# Patient Record
Sex: FEMALE | Race: OTHER | NOT HISPANIC OR LATINO
[De-identification: names, ages, dates, MRNs, and addresses within clinical notes are randomized per-mention and may not be internally consistent; named-entity substitution may affect disease eponyms.]

---

## 2019-12-18 PROBLEM — Z00.00 ENCOUNTER FOR PREVENTIVE HEALTH EXAMINATION: Status: ACTIVE | Noted: 2019-12-18

## 2019-12-20 ENCOUNTER — APPOINTMENT (OUTPATIENT)
Dept: SURGICAL ONCOLOGY | Facility: CLINIC | Age: 19
End: 2019-12-20
Payer: MEDICAID

## 2019-12-20 VITALS
BODY MASS INDEX: 24.54 KG/M2 | DIASTOLIC BLOOD PRESSURE: 90 MMHG | SYSTOLIC BLOOD PRESSURE: 139 MMHG | HEIGHT: 60 IN | HEART RATE: 115 BPM | RESPIRATION RATE: 15 BRPM | WEIGHT: 125 LBS

## 2019-12-20 PROCEDURE — 99244 OFF/OP CNSLTJ NEW/EST MOD 40: CPT

## 2019-12-20 NOTE — ASSESSMENT
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

## 2019-12-20 NOTE — ADDENDUM
[FreeTextEntry1] : I, Anyi Yepez, acted solely as a scribe for Dr. Naseem Bowie on this date 12/20/2019.

## 2019-12-20 NOTE — HISTORY OF PRESENT ILLNESS
[de-identified] : 18 y/o female patient presents for initial consultation for mass of the back of her neck. She notes that it is causing her discomfort, particularly within her shoulders.\par \par Denies fever or chills.

## 2019-12-20 NOTE — PHYSICAL EXAM
[Normal] : supple, no neck mass and thyroid not enlarged [Normal Groin Lymph Nodes] : normal groin lymph nodes [Normal Neck Lymph Nodes] : normal neck lymph nodes  [Normal Supraclavicular Lymph Nodes] : normal supraclavicular lymph nodes [Normal Axillary Lymph Nodes] : normal axillary lymph nodes [Normal] : oriented to person, place and time, with appropriate affect [de-identified] : Prominent soft tissue swelling posterior neck/upper back concerning for intramuscular lipoma. US does not identify any obvious mass.

## 2020-01-01 ENCOUNTER — FORM ENCOUNTER (OUTPATIENT)
Age: 20
End: 2020-01-01

## 2020-01-02 ENCOUNTER — APPOINTMENT (OUTPATIENT)
Dept: MRI IMAGING | Facility: IMAGING CENTER | Age: 20
End: 2020-01-02
Payer: MEDICAID

## 2020-01-02 ENCOUNTER — OUTPATIENT (OUTPATIENT)
Dept: OUTPATIENT SERVICES | Facility: HOSPITAL | Age: 20
LOS: 1 days | End: 2020-01-02
Payer: COMMERCIAL

## 2020-01-02 DIAGNOSIS — R22.1 LOCALIZED SWELLING, MASS AND LUMP, NECK: ICD-10-CM

## 2020-01-02 PROCEDURE — 70543 MRI ORBT/FAC/NCK W/O &W/DYE: CPT | Mod: 26

## 2020-01-02 PROCEDURE — A9585: CPT

## 2020-01-02 PROCEDURE — 70543 MRI ORBT/FAC/NCK W/O &W/DYE: CPT

## 2020-01-12 ENCOUNTER — TRANSCRIPTION ENCOUNTER (OUTPATIENT)
Age: 20
End: 2020-01-12

## 2020-01-15 ENCOUNTER — APPOINTMENT (OUTPATIENT)
Dept: SURGICAL ONCOLOGY | Facility: CLINIC | Age: 20
End: 2020-01-15
Payer: MEDICAID

## 2020-01-15 VITALS
SYSTOLIC BLOOD PRESSURE: 123 MMHG | HEIGHT: 60 IN | DIASTOLIC BLOOD PRESSURE: 84 MMHG | BODY MASS INDEX: 24.54 KG/M2 | OXYGEN SATURATION: 98 % | HEART RATE: 127 BPM | WEIGHT: 125 LBS

## 2020-01-15 PROCEDURE — 99214 OFFICE O/P EST MOD 30 MIN: CPT

## 2020-01-23 ENCOUNTER — OUTPATIENT (OUTPATIENT)
Dept: OUTPATIENT SERVICES | Facility: HOSPITAL | Age: 20
LOS: 1 days | End: 2020-01-23

## 2020-01-23 VITALS
HEIGHT: 60 IN | DIASTOLIC BLOOD PRESSURE: 70 MMHG | SYSTOLIC BLOOD PRESSURE: 110 MMHG | TEMPERATURE: 98 F | OXYGEN SATURATION: 98 % | RESPIRATION RATE: 16 BRPM | WEIGHT: 128.97 LBS | HEART RATE: 102 BPM

## 2020-01-23 DIAGNOSIS — R22.1 LOCALIZED SWELLING, MASS AND LUMP, NECK: ICD-10-CM

## 2020-01-23 DIAGNOSIS — R22.31 LOCALIZED SWELLING, MASS AND LUMP, RIGHT UPPER LIMB: ICD-10-CM

## 2020-01-23 LAB
HCG SERPL-ACNC: < 5 MIU/ML — SIGNIFICANT CHANGE UP
HCT VFR BLD CALC: 36.8 % — SIGNIFICANT CHANGE UP (ref 34.5–45)
HGB BLD-MCNC: 11.1 G/DL — LOW (ref 11.5–15.5)
MCHC RBC-ENTMCNC: 19.6 PG — LOW (ref 27–34)
MCHC RBC-ENTMCNC: 30.2 % — LOW (ref 32–36)
MCV RBC AUTO: 64.9 FL — LOW (ref 80–100)
NRBC # FLD: 0 K/UL — SIGNIFICANT CHANGE UP (ref 0–0)
PLATELET # BLD AUTO: 306 K/UL — SIGNIFICANT CHANGE UP (ref 150–400)
PMV BLD: 11.3 FL — SIGNIFICANT CHANGE UP (ref 7–13)
RBC # BLD: 5.67 M/UL — HIGH (ref 3.8–5.2)
RBC # FLD: 15.3 % — HIGH (ref 10.3–14.5)
WBC # BLD: 9.2 K/UL — SIGNIFICANT CHANGE UP (ref 3.8–10.5)
WBC # FLD AUTO: 9.2 K/UL — SIGNIFICANT CHANGE UP (ref 3.8–10.5)

## 2020-01-23 NOTE — H&P PST ADULT - NEGATIVE FEMALE-SPECIFIC SYMPTOMS
no irregular menses/no abnormal vaginal bleeding/no pelvic pain no abnormal vaginal bleeding/no pelvic pain

## 2020-01-23 NOTE — H&P PST ADULT - NEGATIVE OPHTHALMOLOGIC SYMPTOMS
no loss of vision R/no photophobia/no diplopia/no pain R/no loss of vision L/no blurred vision L/no blurred vision R/no pain L

## 2020-01-23 NOTE — H&P PST ADULT - HISTORY OF PRESENT ILLNESS
19 year old female presents to presurgical testing with diagnosis of localized swelling, mass and lump, neck scheduled for resection of posterior neck mass for 1/27/20. Pt reports neck and shoulder discomfort related to neck mass. MRI completed.

## 2020-01-23 NOTE — H&P PST ADULT - NECK DETAILS
transfer training/gait training/strengthening/balance training/bed mobility training
supple/posterior neck mass, soft, non tender

## 2020-01-23 NOTE — H&P PST ADULT - NEGATIVE NEUROLOGICAL SYMPTOMS
no transient paralysis/no syncope/no paresthesias/no headache/no weakness/no generalized seizures/no tremors/no difficulty walking

## 2020-01-23 NOTE — H&P PST ADULT - ANESTHESIA, PREVIOUS REACTION, PROFILE
none/Denies family Hx of malignant hyperthermia/unknown unknown/Denies family Hx of malignant hyperthermia

## 2020-01-23 NOTE — H&P PST ADULT - NSANTHOSAYNRD_GEN_A_CORE
No. EMERALD screening performed.  STOP BANG Legend: 0-2 = LOW Risk; 3-4 = INTERMEDIATE Risk; 5-8 = HIGH Risk

## 2020-01-23 NOTE — H&P PST ADULT - NEGATIVE ENMT SYMPTOMS
no hearing difficulty/no ear pain/no nose bleeds/no vertigo/no dysphagia/no tinnitus/no throat pain/no sinus symptoms

## 2020-01-23 NOTE — H&P PST ADULT - RS GEN PE MLT RESP DETAILS PC
respirations non-labored/good air movement/breath sounds equal/clear to auscultation bilaterally/no rhonchi/no rales/airway patent/no wheezes

## 2020-01-23 NOTE — H&P PST ADULT - NSICDXPROBLEM_GEN_ALL_CORE_FT
PROBLEM DIAGNOSES  Problem: Localized swelling, mass and lump, neck  Assessment and Plan: Pt is scheduled for resection of posterior neck mass for 1/27/20. Pre-op instructions provided. Pt given verbal and written instructions with teach back on chlorhexidine shampoo and pepcid. Urine cup provided for day of procedure pregnancy test. Pt verbalized understanding with return demonstration.

## 2020-01-26 ENCOUNTER — TRANSCRIPTION ENCOUNTER (OUTPATIENT)
Age: 20
End: 2020-01-26

## 2020-01-27 ENCOUNTER — RESULT REVIEW (OUTPATIENT)
Age: 20
End: 2020-01-27

## 2020-01-27 ENCOUNTER — OUTPATIENT (OUTPATIENT)
Dept: OUTPATIENT SERVICES | Facility: HOSPITAL | Age: 20
LOS: 1 days | Discharge: HOME | End: 2020-01-27
Payer: MEDICAID

## 2020-01-27 ENCOUNTER — APPOINTMENT (OUTPATIENT)
Dept: SURGICAL ONCOLOGY | Facility: AMBULATORY SURGERY CENTER | Age: 20
End: 2020-01-27

## 2020-01-27 VITALS
RESPIRATION RATE: 16 BRPM | SYSTOLIC BLOOD PRESSURE: 116 MMHG | DIASTOLIC BLOOD PRESSURE: 71 MMHG | HEART RATE: 96 BPM | TEMPERATURE: 98 F | OXYGEN SATURATION: 100 % | WEIGHT: 128.97 LBS | HEIGHT: 60 IN

## 2020-01-27 VITALS
DIASTOLIC BLOOD PRESSURE: 83 MMHG | TEMPERATURE: 98 F | RESPIRATION RATE: 15 BRPM | OXYGEN SATURATION: 100 % | HEART RATE: 95 BPM | SYSTOLIC BLOOD PRESSURE: 132 MMHG

## 2020-01-27 DIAGNOSIS — R22.1 LOCALIZED SWELLING, MASS AND LUMP, NECK: ICD-10-CM

## 2020-01-27 PROCEDURE — 88304 TISSUE EXAM BY PATHOLOGIST: CPT | Mod: 26

## 2020-01-27 PROCEDURE — 21554 EXC NECK TUM DEEP 5 CM/>: CPT

## 2020-01-27 RX ORDER — AZITHROMYCIN 500 MG/1
1 TABLET, FILM COATED ORAL
Qty: 0 | Refills: 0 | DISCHARGE

## 2020-01-27 RX ORDER — SODIUM CHLORIDE 9 MG/ML
1000 INJECTION, SOLUTION INTRAVENOUS
Refills: 0 | Status: DISCONTINUED | OUTPATIENT
Start: 2020-01-27 | End: 2020-02-11

## 2020-01-27 RX ORDER — ACETAMINOPHEN 500 MG
2 TABLET ORAL
Qty: 0 | Refills: 0 | DISCHARGE

## 2020-01-27 RX ORDER — NORGESTIMATE AND ETHINYL ESTRADIOL 7DAYSX3 LO
1 KIT ORAL
Qty: 0 | Refills: 0 | DISCHARGE

## 2020-01-27 RX ORDER — FAMOTIDINE 10 MG/ML
1 INJECTION INTRAVENOUS
Qty: 0 | Refills: 0 | DISCHARGE

## 2020-01-27 RX ORDER — OMEGA-3 ACID ETHYL ESTERS 1 G
1 CAPSULE ORAL
Qty: 0 | Refills: 0 | DISCHARGE

## 2020-01-27 NOTE — ASU PREOP CHECKLIST - SURGICAL CONSENT
done Mohs Histo Method Verbiage: Each section was then chromacoded and processed in the Mohs lab using the Mohs protocol and submitted for frozen section.

## 2020-01-27 NOTE — BRIEF OPERATIVE NOTE - NSICDXBRIEFPREOP_GEN_ALL_CORE_FT
PRE-OP DIAGNOSIS:  Mass of neck 27-Jan-2020 12:17:37  Ginna Rojas  Mass of neck 27-Jan-2020 12:17:24  Ginna Rojas

## 2020-01-27 NOTE — ASU DISCHARGE PLAN (ADULT/PEDIATRIC) - CARE PROVIDER_API CALL
Naseem Bowie)  Surgery  [address] [place] [address]  Phone: [phone]  Fax: [phone]  Follow Up Time: 2 weeks

## 2020-01-27 NOTE — ASU DISCHARGE PLAN (ADULT/PEDIATRIC) - CALL YOUR DOCTOR IF YOU HAVE ANY OF THE FOLLOWING:
Numbness, tingling, color or temperature change to extremity/Wound/Surgical Site with redness, or foul smelling discharge or pus/Nausea and vomiting that does not stop/Pain not relieved by Medications/Increased irritability or sluggishness/Unable to urinate/Inability to tolerate liquids or foods/Excessive diarrhea/Fever greater than (need to indicate Fahrenheit or Celsius)/Bleeding that does not stop/Swelling that gets worse

## 2020-01-30 NOTE — ADDENDUM
[FreeTextEntry1] : I, Ayni Yepez, acted solely as a scribe for Dr. Naseem Bowie on this date 01/15/2020.

## 2020-01-30 NOTE — ASSESSMENT
[FreeTextEntry1] : Suspect probable unencapsulated lipoma posterior lower neck/upper back\par I had a long discussion with the patient and we have agreed to proceed with surgical resection in 2 weeks\par Procedure discussed in detail\par All questions answered

## 2020-01-30 NOTE — CONSULT LETTER
[Dear  ___] : Dear  [unfilled], [Courtesy Letter:] : I had the pleasure of seeing your patient, [unfilled], in my office today. [Please see my note below.] : Please see my note below. [Sincerely,] : Sincerely, [FreeTextEntry3] : Naseem Bowie MD FACS

## 2020-01-30 NOTE — PHYSICAL EXAM
[Normal] : supple, no neck mass and thyroid not enlarged [Normal Supraclavicular Lymph Nodes] : normal supraclavicular lymph nodes [Normal Neck Lymph Nodes] : normal neck lymph nodes  [Normal Groin Lymph Nodes] : normal groin lymph nodes [Normal Axillary Lymph Nodes] : normal axillary lymph nodes [Normal] : oriented to person, place and time, with appropriate affect [de-identified] : Prominent posterior lower neck fatty subcutaneous tissue unchanged from prior exam.

## 2020-01-30 NOTE — HISTORY OF PRESENT ILLNESS
[de-identified] : 18 y/o female patient presents for follow-up for mass of the back of her neck. She notes that it is causing her discomfort, particularly within her shoulders.\par \par MRI Orbit Face and Neck (1/2/2020): Prominent subcutaneous adipose tissue is noted in the dorsal midline mid-lower neck and upper thoracic region. The adjacent paraspinal musculature, trapezius muscles, and bony cervical spine appear normal. There is no evidence of encapsulation or abnormal enhancement. Differential considerations include an unencapsulated lipoma versus prominent adipose tissue.\par \par She notes she has not seen an endocrinologist.\par Denies fever or chills.

## 2020-01-31 PROBLEM — R22.1 LOCALIZED SWELLING, MASS AND LUMP, NECK: Chronic | Status: ACTIVE | Noted: 2020-01-23

## 2020-01-31 PROBLEM — D56.3 THALASSEMIA MINOR: Chronic | Status: ACTIVE | Noted: 2020-01-23

## 2020-01-31 PROBLEM — J45.909 UNSPECIFIED ASTHMA, UNCOMPLICATED: Chronic | Status: ACTIVE | Noted: 2020-01-23

## 2020-02-05 ENCOUNTER — APPOINTMENT (OUTPATIENT)
Dept: SURGICAL ONCOLOGY | Facility: CLINIC | Age: 20
End: 2020-02-05
Payer: MEDICAID

## 2020-02-05 VITALS
RESPIRATION RATE: 15 BRPM | BODY MASS INDEX: 24.54 KG/M2 | SYSTOLIC BLOOD PRESSURE: 124 MMHG | HEIGHT: 60 IN | HEART RATE: 103 BPM | DIASTOLIC BLOOD PRESSURE: 88 MMHG | WEIGHT: 125 LBS

## 2020-02-05 PROCEDURE — 99024 POSTOP FOLLOW-UP VISIT: CPT

## 2020-02-05 NOTE — ADDENDUM
[FreeTextEntry1] : I, Anyi Yepez, acted solely as a scribe for Dr. Naseem Bowie on this date 02/05/2020.

## 2020-02-05 NOTE — PROCEDURE
[FreeTextEntry1] : US-guided FNA of posterior neck seroma performed under sterile conditions using 1% lidocaine with epinephrine.\par 40 ccs of serosanguineous fluid aspirated and discarded.\par Patient tolerated procedure well.\par Sterile dressing applied.

## 2020-02-05 NOTE — HISTORY OF PRESENT ILLNESS
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 The adjacent paraspinal musculature, trapezius muscles, and bony cervical spine appear normal. There is no evidence of encapsulation or abnormal enhancement. Differential considerations include an unencapsulated lipoma versus prominent adipose tissue.\par \par She notes she has not seen an endocrinologist.\par She c/o left shoulder discomfort.\par Denies fever or chills.

## 2020-02-05 NOTE — ASSESSMENT
[FreeTextEntry1] : S/p posterior neck mass excision\par Pathology revealed fibrolipoma\par No malignancy\par Normal post-operative course.\par RTO PRN for repeat seroma aspiration\par \par Enclosed pathology report.

## 2020-02-05 NOTE — PHYSICAL EXAM
[Normal] : supple, no neck mass and thyroid not enlarged [Normal Neck Lymph Nodes] : normal neck lymph nodes  [Normal Supraclavicular Lymph Nodes] : normal supraclavicular lymph nodes [Normal Axillary Lymph Nodes] : normal axillary lymph nodes [Normal Groin Lymph Nodes] : normal groin lymph nodes [Normal] : grossly intact [de-identified] : Posterior neck incision healing well with post-op seroma. No evidence of infection.

## 2020-02-12 ENCOUNTER — APPOINTMENT (OUTPATIENT)
Dept: SURGICAL ONCOLOGY | Facility: CLINIC | Age: 20
End: 2020-02-12
Payer: MEDICAID

## 2020-02-12 VITALS
SYSTOLIC BLOOD PRESSURE: 117 MMHG | DIASTOLIC BLOOD PRESSURE: 78 MMHG | WEIGHT: 126 LBS | OXYGEN SATURATION: 97 % | RESPIRATION RATE: 15 BRPM | BODY MASS INDEX: 24.74 KG/M2 | HEART RATE: 92 BPM | HEIGHT: 60 IN

## 2020-02-12 PROCEDURE — 10140 I&D HMTMA SEROMA/FLUID COLLJ: CPT | Mod: 58

## 2020-02-12 PROCEDURE — 99024 POSTOP FOLLOW-UP VISIT: CPT

## 2020-02-20 ENCOUNTER — APPOINTMENT (OUTPATIENT)
Dept: SURGICAL ONCOLOGY | Facility: CLINIC | Age: 20
End: 2020-02-20

## 2020-02-20 NOTE — HISTORY OF PRESENT ILLNESS
[de-identified] : Nnamdi is a pleasant 20 year-old female who is a patient of my partner, Dr. Naseem Bowie.  She is s/p posterior neck subcutaneous mass excision on 1/27/2020.  Pathology was consistent with with a fibrolipoma.  She is s/p US-guided FNA of posterior neck seroma on 2/5/2020 with 40ccs of serosanguineous fluid aspirated.  She was asked to return to the office as needed for repeat seroma aspiration. \par \par

## 2020-02-20 NOTE — PHYSICAL EXAM
[de-identified] : Posterior neck incision well healed. Moderate post-operative seroma. 40ccs serous fluid aspirated under US guidance and discarded.

## 2020-02-28 NOTE — HISTORY OF PRESENT ILLNESS
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 The adjacent paraspinal musculature, trapezius muscles, and bony cervical spine appear normal. There is no evidence of encapsulation or abnormal enhancement. Differential considerations include an unencapsulated lipoma versus prominent adipose tissue.\par \par She notes she has not seen an endocrinologist.\par She c/o posterior neck seroma.\par Denies fever or chills.

## 2020-02-28 NOTE — ASSESSMENT
[FreeTextEntry1] : S/p posterior neck lipoma\par Seroma drained\par RTO PRN for repeat seroma aspiration

## 2020-02-28 NOTE — PHYSICAL EXAM
[de-identified] : Posterior neck incision well healed. Moderate post-operative seroma. 40ccs serous fluid aspirated under US guidance and discarded.

## 2020-02-28 NOTE — ADDENDUM
[FreeTextEntry1] : I, Anyi Yepez, acted solely as a scribe for Dr. Naseem Bowie on this date 02/12/2020.

## 2020-03-11 ENCOUNTER — APPOINTMENT (OUTPATIENT)
Dept: SURGICAL ONCOLOGY | Facility: CLINIC | Age: 20
End: 2020-03-11
Payer: MEDICAID

## 2020-03-11 VITALS
SYSTOLIC BLOOD PRESSURE: 123 MMHG | HEIGHT: 60 IN | BODY MASS INDEX: 24.74 KG/M2 | OXYGEN SATURATION: 95 % | DIASTOLIC BLOOD PRESSURE: 71 MMHG | RESPIRATION RATE: 15 BRPM | WEIGHT: 126 LBS | HEART RATE: 80 BPM

## 2020-03-11 DIAGNOSIS — R22.1 LOCALIZED SWELLING, MASS AND LUMP, NECK: ICD-10-CM

## 2020-03-11 PROCEDURE — 99024 POSTOP FOLLOW-UP VISIT: CPT

## 2020-03-11 NOTE — ADDENDUM
[FreeTextEntry1] : I, Anyi Yepez, acted solely as a scribe for Dr. Naseem Bowie on this date 03/11/2020.

## 2020-03-11 NOTE — PHYSICAL EXAM
LMOM at the law office of Krishan Santillan stating that Dr Aric Smallwood will not fill out the Disability Forms on this patient  She would have to find where the patient would be able to get these forms filled out  I did state that there are [facility]'s doctor's that do fill these forms out she would need to contact the [facility]'s network and find out where she can go  [de-identified] : Posterior neck incision well healed. No seroma noted. US negative for any underlying fluid collections.

## 2020-03-11 NOTE — HISTORY OF PRESENT ILLNESS
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 The adjacent paraspinal musculature, trapezius muscles, and bony cervical spine appear normal. There is no evidence of encapsulation or abnormal enhancement. Differential considerations include an unencapsulated lipoma versus prominent adipose tissue.\par \par She notes she has not seen an endocrinologist.\par She notes possible seroma of posterior neck.\par Denies fever or chills.

## 2021-12-01 ENCOUNTER — EMERGENCY (EMERGENCY)
Facility: HOSPITAL | Age: 21
LOS: 1 days | Discharge: HOME | End: 2021-12-01
Attending: EMERGENCY MEDICINE | Admitting: EMERGENCY MEDICINE
Payer: MEDICAID

## 2021-12-01 VITALS
SYSTOLIC BLOOD PRESSURE: 126 MMHG | HEIGHT: 60 IN | TEMPERATURE: 98 F | DIASTOLIC BLOOD PRESSURE: 66 MMHG | OXYGEN SATURATION: 100 % | RESPIRATION RATE: 16 BRPM | HEART RATE: 104 BPM

## 2021-12-01 PROCEDURE — 99284 EMERGENCY DEPT VISIT MOD MDM: CPT

## 2021-12-01 PROCEDURE — 73130 X-RAY EXAM OF HAND: CPT | Mod: 26,RT

## 2021-12-01 NOTE — ED PROVIDER NOTE - PATIENT PORTAL LINK FT
You can access the FollowMyHealth Patient Portal offered by [facility] by registering at the following website: [email or web address]. By joining [facility]’s FollowMyHealth portal, you will also be able to view your health information using other applications (apps) compatible with our system.

## 2021-12-01 NOTE — ED ADULT TRIAGE NOTE - CHIEF COMPLAINT QUOTE
c/o right hand pain/swelling. Pt was bit by her house cat, punctured skin, states cat is up to date with vaccines. Last tetanus 2018

## 2021-12-01 NOTE — ED ADULT TRIAGE NOTE - HISTORY OF COVID-19 VACCINATION
O-L Flap Text: The defect edges were debeveled with a #15 scalpel blade.  Given the location of the defect, shape of the defect and the proximity to free margins an O-L flap was deemed most appropriate.  Using a sterile surgical marker, an appropriate advancement flap was drawn incorporating the defect and placing the expected incisions within the relaxed skin tension lines where possible.    The area thus outlined was incised deep to adipose tissue with a #15 scalpel blade.  The skin margins were undermined to an appropriate distance in all directions utilizing iris scissors. Yes

## 2021-12-01 NOTE — ED PROVIDER NOTE - ATTENDING CONTRIBUTION TO CARE
22 Y/O F w/ no PMH presents to ER for RT (dominant) hand injury following animal bite. PT tabby cat bit her hand and inner thighs 2 days ago. Seen at urgent care and prescribed Augmentin. UTD on Tdap. Reports worsening swelling and redness to hand. Denies fever, headache, nausea/vomiting, dizziness, headache, chest pain or chills

## 2021-12-01 NOTE — ED PROVIDER NOTE - OBJECTIVE STATEMENT
20 Y/O F w/ no PMH presents to ER for RT (dominant) hand injury following animal bite. PT tabby cat bit her hand and inner thighs 2 days ago. Seen at urgent care and prescribed Augmentin. UTD on Tdap. Reports worsening swelling and redness to hand. Denies fever, headache, nausea/vomiting, dizziness, headache, chest pain or chills

## 2021-12-01 NOTE — ED PROVIDER NOTE - NSICDXPASTMEDICALHX_GEN_ALL_CORE_FT
PAST MEDICAL HISTORY:  Asthma     Localized swelling, mass and lump, neck     Thalassemia trait

## 2021-12-01 NOTE — ED PROVIDER NOTE - CLINICAL SUMMARY MEDICAL DECISION MAKING FREE TEXT BOX
22 Y/O F w/ no PMH presents to ER for RT (dominant) hand injury following animal bite.   FAROM of digits. 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

## 2021-12-01 NOTE — ED PROVIDER NOTE - PHYSICAL EXAMINATION
Vital signs reviewed.   CONSTITUTIONAL: Well-appearing; well-nourished; in no apparent distress. Non-toxic appearing.   HEAD: Normocephalic, atraumatic.  EYES: Normal conjunctiva and no sclera injection noted  ENT: normal nose; no rhinorrhea  CARD: Normal S1, S2  RESP: Normal chest excursion with respiration; breath sounds clear and equal bilaterally  EXT/MS: moves all extremities; distal pulses are normal, no pedal edema.  SKIN: RT hand: Erythema to dorsal aspect of hand. Warm to touch w/ puncture at center. No active drainage. FAROM of digits. Radial pulse +2  Normal for age and race; warm; dry; good turgor; no apparent lesions or exudate noted. Abrasions noted to inner thigh  NEURO: Awake, alert, oriented x 3  PSYCH: Normal mood; appropriate affect.

## 2021-12-01 NOTE — ED PROVIDER NOTE - NS ED ROS FT
Constitutional: (-) fever   Head: Normal cephalic, Atraumatic  Cardiovascular: (-) chest pain, (-) wheezing  Respiratory: (-) cough, (-) shortness of breath  Gastrointestinal: (-) vomiting, (-) diarrhea, (-) abdominal pain  : (-) dysuria   Musculoskeletal: (-) back pain (+) RT hand pain  Integumentary: (-) rash, (-) edema  Neurological: (-)loc  Allergic/Immunologic: (-) pruritus

## 2021-12-02 RX ADMIN — Medication 100 MILLIGRAM(S): at 01:04

## 2022-04-13 ENCOUNTER — APPOINTMENT (OUTPATIENT)
Dept: UROLOGY | Facility: CLINIC | Age: 22
End: 2022-04-13
Payer: MEDICAID

## 2022-04-13 VITALS
OXYGEN SATURATION: 98 % | WEIGHT: 126 LBS | BODY MASS INDEX: 23.79 KG/M2 | DIASTOLIC BLOOD PRESSURE: 85 MMHG | SYSTOLIC BLOOD PRESSURE: 123 MMHG | HEART RATE: 97 BPM | HEIGHT: 61 IN

## 2022-04-13 DIAGNOSIS — N39.41 URGE INCONTINENCE: ICD-10-CM

## 2022-04-13 DIAGNOSIS — R32 UNSPECIFIED URINARY INCONTINENCE: ICD-10-CM

## 2022-04-13 DIAGNOSIS — Z82.49 FAMILY HISTORY OF ISCHEMIC HEART DISEASE AND OTHER DISEASES OF THE CIRCULATORY SYSTEM: ICD-10-CM

## 2022-04-13 DIAGNOSIS — R35.0 FREQUENCY OF MICTURITION: ICD-10-CM

## 2022-04-13 DIAGNOSIS — Z80.1 FAMILY HISTORY OF MALIGNANT NEOPLASM OF TRACHEA, BRONCHUS AND LUNG: ICD-10-CM

## 2022-04-13 PROCEDURE — 51798 US URINE CAPACITY MEASURE: CPT

## 2022-04-13 PROCEDURE — 99204 OFFICE O/P NEW MOD 45 MIN: CPT | Mod: 25

## 2022-04-14 LAB
APPEARANCE: CLEAR
BACTERIA: NEGATIVE
BILIRUBIN URINE: NEGATIVE
BLOOD URINE: NEGATIVE
COLOR: YELLOW
GLUCOSE QUALITATIVE U: NEGATIVE
HYALINE CASTS: 0 /LPF
KETONES URINE: NEGATIVE
LEUKOCYTE ESTERASE URINE: NEGATIVE
MICROSCOPIC-UA: NORMAL
NITRITE URINE: NEGATIVE
PH URINE: 6.5
PROTEIN URINE: NEGATIVE
RED BLOOD CELLS URINE: 0 /HPF
SPECIFIC GRAVITY URINE: 1.02
SQUAMOUS EPITHELIAL CELLS: 1 /HPF
UROBILINOGEN URINE: NORMAL
WHITE BLOOD CELLS URINE: 1 /HPF

## 2022-04-15 LAB — BACTERIA UR CULT: NORMAL

## 2022-04-22 PROBLEM — R35.0 URINARY FREQUENCY: Status: ACTIVE | Noted: 2022-04-22

## 2022-04-22 NOTE — LETTER BODY
[Dear  ___] : Dear  [unfilled], [Consult Letter:] : I had the pleasure of evaluating your patient, [unfilled]. [Please see my note below.] : Please see my note below. [Consult Closing:] : Thank you very much for allowing me to participate in the care of this patient.  If you have any questions, please do not hesitate to contact me. [FreeTextEntry1] : Please see my note. I will keep you informed of her progress. [FreeTextEntry3] : Sincerely,\par \par Kimberly Resendiz MD\par Clinical \par [facility]\par [facility]\par

## 2022-04-22 NOTE — REVIEW OF SYSTEMS
[during] : pain during intercourse [Strong urge to urinate] : strong urge to urinate [Strain or push to urinate] : strain or push to urinate [Anxiety] : anxiety [Negative] : Heme/Lymph [Constipation] : no constipation

## 2022-04-22 NOTE — ASSESSMENT
[FreeTextEntry1] : Patient and I had an extremely lengthy conversation regarding her urgency and urge incontinence. We discussed a voiding diary, where she can demonstrate her time of waking and going to bed, as well as the number of voids in the daytime and evening. She should maintain this diary for 48-72 hours to establish her baseline. We talked at length about the Kegel exercises and bladder training and she was given instructional paperwork. She should consciously make an effort to hold her urine for 5-10 minutes after she feels the need to void. Performing regular Kegel exercises, doing at least 3 sets of 10 per day, and holding the contraction for up to 10 seconds each, should help her to increase the interval between voids, and to thereby slowly increase her bladder capacity with the bladder training and KEGELS.\par \par She understands that she can do Kegels with or without the help of pelvic floor therapy where they can teach her to isolate and strengthen the musculature. We also discussed the possibility of adding medications, such as anticholinergics or the beta agonist, Myrbetriq.\par \par Lastly, normal bowels must be maintainted. I have suggested aggressive hydration, more fiber in diet, daily probiotics, and a daily stool softener  if she becomes constipatied.  It could take up to 4 mo to notice a difference, at which point she should repeat voiding diary to assess for any improvement.\par \par UA, CX sent\par PVR checked to r/o incomplete emptying - 0\par \par 45 min spent\par \par

## 2022-04-22 NOTE — HISTORY OF PRESENT ILLNESS
[FreeTextEntry1] : DEBORAH NAIR is a 22 year old F who presents with urgency and UUI.\par \par Voids at work 6 or 7x's/ in a 12 hr shift. If she goes q 2 hrs, she will not leak. If she tries to hold it a few min after the urge comes on, she will leak. She is not wearing pads. This is getting worse for the last few months. Denies changes in diet, supplements or bowels.  Denies constipation.\par \par No h/o complicated or febrile UTI, but gets frequent yeast infections.\par No pain or burning with urgency.\par \par has occ pain during intercourse\par \par M is 52\par D is 60\par \par No  malignancies or anomalies, ESRD or stone disease in family; maternal aunt with breast cancer in her 30's;\par \par This pt is BRCA negative and her mom is OK.

## 2022-07-06 NOTE — H&P PST ADULT - NEGATIVE BREAST SYMPTOMS
LM for pt to call me to discuss what requirements we are still needing to move forward with scheduling review appt with Dr Ferguson.       Psych eval  PCP support form no breast tenderness R/no breast tenderness L

## 2023-02-07 NOTE — H&P PST ADULT - HEIGHT IN CM
-- DO NOT REPLY / DO NOT REPLY ALL --  -- Message is from Engagement Center Operations (ECO) --    Order Request  Lab: TSH    Message / reason: patient spouse called in requesting orders for the patient labs    Insurance type: n/a  Payor: [employer] / Plan: SHREYA BE BA [id number] / Product Type:  BLUE ADVANTAGE    Preferred Delivery Method   Call when ready for pickup - phone number to notify: [phone]     Caller Information       Type Contact Phone/Fax    02/07/2023 02:59 PM CST Phone (Incoming) Williejorge Solomon (Emergency Contact) [phone]          Alternative phone number: [phone]    Can a detailed message be left? Yes only [phone]    Message Turnaround:     [place]:    Please give this turnaround time to the caller:   \"This message will be sent to [state Provider's name]. The clinical team will fulfill your request as soon as they review your message.\"   152.4

## 2023-02-28 NOTE — ED PROVIDER NOTE - BIRTH SEX
Procedure:  EXCISION PILONIDAL CYST (Buttocks)    Relevant Problems   Musculoskeletal and Integument   (+) Infected pilonidal cyst      Other   (+) ADHD (attention deficit hyperactivity disorder)        Physical Exam    Airway    Mallampati score: II  TM Distance: >3 FB  Neck ROM: full     Dental   No notable dental hx     Cardiovascular      Pulmonary      Other Findings        Anesthesia Plan  ASA Score- 2     Anesthesia Type- general with ASA Monitors  Additional Monitors:   Airway Plan: ETT  Plan Factors-Exercise tolerance (METS): >4 METS  Chart reviewed  Patient summary reviewed  Patient is a current smoker  Patient did not smoke on day of surgery  Obstructive sleep apnea risk education given perioperatively  Induction- intravenous  Postoperative Plan- Plan for postoperative opioid use  Planned trial extubation    Informed Consent- Anesthetic plan and risks discussed with patient  I personally reviewed this patient with the CRNA  Discussed and agreed on the Anesthesia Plan with the CRNA  Blaze Weber
Female